# Patient Record
(demographics unavailable — no encounter records)

---

## 2025-02-25 NOTE — HISTORY OF PRESENT ILLNESS
[FreeTextEntry1] : 57 year old female comes in today to discuss weight management. She has struggled with her weight since she was 8 years old.  She has tried WW, Nutrisystem, Medifast (lost 75 pounds in 5 months). They all worked for a time, but were not sustainable. She also did a weight management program with [facility], but she couldn't see an MD unless she wanted to use medication. She did see the RD every 4-6 weeks. She was told to ensure she got 4 meals a day with 30g of protein in each meal. She also tried intermittent fasting. She isn't opposed to medication, but she would like to try to do it without to start. She also is uneasy over injectables vs. oral agents.  Patient's doctor gave her metformin XR, but she didn't take it.  Patient has been on an elimination diet the last 3 weeks. She had felt achey, tired, bloated and had heartburn. She cut out gluten, dairy, eggs, soy, sugar, artificial sweeteners, peanuts and corn, and she feels much better. She is slowly reintroducing things to see what the culprit may be.   Physical Activity  Patient was limited for a year or so because of extensive foot surgery. She is at the point where she can walk now. She has a treadmill, an arc , has a dog, and has weights and a weight bench.  Nutrition 24 hour recall: Breakfast: Mocha smoothie Lunch: Chicken 4 ounces, butternut squash soup Dinner: Fajita chicken 4 ounces with onions, roasted vegetables, strawberries. Later: Spinach salad Snack: Raw nuts and strawberries.  Patient does not eat fish.

## 2025-02-25 NOTE — ASSESSMENT
[FreeTextEntry1] : Morbid obesity:  Patient has made multiple attempts at weight loss in the past without lasting success.  I did review phentermine, Qsymia, Contrave and GLP class drugs with her in detail. She appreciated the information, but is going to hold off for now. She knows they are an option at any time should she change her mind. I am happy to work with her with or without medication assistance.  I discussed the basics of a health dietary pattern with the patient. She was given the Nutrition basics handout. I would like to see a little more variety in her diet, though some of the monotomy was due to the elimination diet she has been following of course. I would like to see her incorporate more plant based protein as most of her protein is coming from animal sources. I am concerned that she get enough fiber in her diet as well. Starting an exercise program is also key, and she is keen to set small, progressive goals.  We agreed to the following action plan: Use [employer] to track your calories for one full week. Send that to me with a starting and ending weight and I will get back to you with a general goal.   Physical Activity:   Mondays and Wednesdays  By 9 AM  Ten minutes of upper body  Ten minutes of core  Ten minute of lower body  Youtube -- type in the search bar for example:  "ten minutes core"     Nutrition:  Two dinners a week will be plant protein (beans)  Recipes given (chickpea dishes)  Patient will see me in one month, but I will get back to her sooner re: her calorie tracking.  Total time spent reviewing records, seeing patient, generating action plan, documenting visit 65 minutes.

## 2025-02-25 NOTE — SOCIAL HISTORY
[TextEntry] : Lives with . Has a 24 year old son at home and a 21 year old daughter starting law school next year. No tobacco. No alcohol -- allergic No drug use.